# Patient Record
Sex: FEMALE | Race: WHITE | NOT HISPANIC OR LATINO | URBAN - METROPOLITAN AREA
[De-identification: names, ages, dates, MRNs, and addresses within clinical notes are randomized per-mention and may not be internally consistent; named-entity substitution may affect disease eponyms.]

---

## 2020-04-14 ENCOUNTER — TELEMEDICINE CONVERTED (OUTPATIENT)
Dept: GASTROENTEROLOGY | Facility: CLINIC | Age: 45
End: 2020-04-14
Attending: NURSE PRACTITIONER

## 2020-09-11 ENCOUNTER — HOSPITAL ENCOUNTER (OUTPATIENT)
Dept: PREADMISSION TESTING | Facility: HOSPITAL | Age: 45
Discharge: HOME | End: 2020-09-11
Attending: INTERNAL MEDICINE

## 2020-09-12 LAB — SARS-COV-2 RNA SPEC QL NAA+PROBE: NOT DETECTED

## 2020-09-15 ENCOUNTER — HOSPITAL ENCOUNTER (OUTPATIENT)
Dept: GASTROENTEROLOGY | Facility: HOSPITAL | Age: 45
Setting detail: HOSPITAL OUTPATIENT SURGERY
Discharge: HOME | End: 2020-09-15
Attending: INTERNAL MEDICINE

## 2020-09-15 LAB — HCG UR QL: NEGATIVE

## 2021-05-10 NOTE — H&P
History and Physical      Patient Name: Claudia Blanc   Patient ID: [id number]   Sex: Female   YOB: 1975    Primary Care Provider: [facility]   Referring Provider: Jonnie Flores MD    Visit Date: April 14, 2020    Provider: RAYSHAWN Johnson   Location: [facility]   Location Address: [address] [place] [address]  [id number]   Location Phone: [phone]          Chief Complaint  · Abdominal Pain  · Constipation      History Of Present Illness  Video Conferencing Visit  Claudia Blanc is a 44 year old /White female who is presenting for evaluation via video conferencing. Verbal consent obtained before beginning visit.   The following staff were present during this visit: TAHMINA Sheth [facility], April Addy TRUJILLO, Claudine Saleh      Patient went to the ER 1/23/2020 with abdominal pain, constipation, nausea, she was given mag citrate and MiraLAX 1/23/2020: CBC unremarkable, UA negative, CMP unremarkable, lipase negative  Abdominal x-ray 1/23/2020 showed moderate amount of stool over the transverse and right colon    Pt states the Miralax causes abd bloating and discomfort. Has tried a couple OTC meds. Pt states she may go 6-7 days w/o a BM if not taking any medicines.  No blood in stool, no abd pain, no unint wt loss, no vomiting but does have HB. Pt is not taking any medicine for HB except has tried TUMS.   Last Colonoscopy 2014 , + polyps       Past Surgical History  Colonoscopy; EGD         Medication List  Women's Multivitamin oral         Allergy List  NO KNOWN DRUG ALLERGIES         Family Medical History  No family history of malignant neoplasm         Social History  Tobacco (Former)         Review of Systems  · Constitutional  o Admits  o : good general health lately, no acute distress  · Eyes  o Denies  o : cataracts, glaucoma  · HENT  o Denies  o : hearing problems, trouble swallowing  · Cardiovascular  o Denies  o : chest pain, blood clots or  phlebitis  · Respiratory  o Denies  o : asthma, shortness of breath  · Gastrointestinal  o Denies  o : additional gastrointestinal symptoms except as noted in the HPI  · Genitourinary  o Denies  o : dysuria, kidney stone  · Integument  o Denies  o : rashes, sores  · Neurologic  o Denies  o : strokes, seizure activity  · Musculoskeletal  o Admits  o : bone or joint pain  o Denies  o : arthritis  · Psychiatric  o Denies  o : depression, anxiety  · Heme-Lymph  o Denies  o : bleeding disorder  · Allergic-Immunologic  o Denies  o : seasonal allergies      Physical Examination  · Constitutional  o Appearance  o : well developed, well-nourished, in no acute distress  · Head and Face  o Head  o :   § Inspection  § : atraumatic, normocephalic  · Eyes  o Sclerae  o : sclerae white, no sclerae icterus  · Neck  o Inspection/Palpation  o : supple  · Respiratory  o Respiratory Effort  o : breathing unlabored  o Inspection of Chest  o : normal appearance, no retractions  · Skin and Subcutaneous Tissue  o General Inspection  o : no lesions present, no rashes present--of face, hands or arms  · Neurologic  o Mental Status Examination  o :   § Orientation  § : grossly oriented to person, place and time  § Speech/Language  § : communication ability within normal limits, voice quality normal, articulation of speech normal, no evidence of aphasia  § Attention  § : attention normal, concentration abilities normal  o Sensation  o : grossly intact  · Psychiatric  o General  o : Alert and oriented x3  o Mood and Affect  o : Mood and affect are appropriate to circumstances          Assessment  · Pre-op exam     V72.84/Z01.818  · Constipation     564.00/K59.00  · Heartburn     787.1/R12  · History of colon polyps     V12.72/Z86.010      Plan  · Medications  o Golytely 236-22.74-6.74 -5.86 gram oral recon soln   SIG: take as directed   DISP: (1) 4000 ml bottle with 0 refills  Prescribed on 04/14/2020     o Trulance 3 mg oral tablet   SIG: take  1 tablet (3 mg) by oral route once daily for 30 days   DISP: (30) tablets with 3 refills  Prescribed on 04/14/2020     o Protonix 20 mg oral tablet,delayed release (DR/EC)   SIG: take 1 tablet (20 mg) by oral route once daily for 30 days   DISP: (30) tablets with 1 refills  Prescribed on 04/14/2020     o Miralax 17 gram/dose oral powder   SIG: take 17 gram mixed with 8 oz. water, juice, soda, coffee or tea by oral route once daily   DISP: (1) 119 gm jar with 0 refills  Discontinued on 04/14/2020     o Medications have been Reconciled  o Transition of Care or Provider Policy  · Instructions  o Please Sign Permit for: colonoscopy  o Indication: screening hx of polyps  o Surgical Risk and Benefits: Possible risks/complications, benefits, and alternatives to surgical or invasive procedure have been explained to patient and/or legal guardian; Patient has been evaluated and can tolerate anesthesia and/or sedation. Risks, benefits, and alternatives to anesthesia and sedation have been explained to patient and/or legal guardian.  o Follow Up after Procedure.  o Patient was educated/instructed on their diagnosis, treatment and medications prior to discharge from the clinic today.  o Patient instructed to seek medical attention urgently for new or worsening symptoms.  o Call if any problems with new medications; advised to take Protonix x 2 months, if HB returns after completing then EGD also.   o ADDENDUM: CT received after visit from 2/13/20 was negative other than moderate stool burden.  · Referrals  o ID: [id number] Date: 04/13/2020 Type: Inbound  Specialty: Gastroenterology            Electronically Signed by: RAYSHAWN Johnson -Author on April 15, 2020 09:26:38 AM